# Patient Record
Sex: FEMALE | Race: WHITE
[De-identification: names, ages, dates, MRNs, and addresses within clinical notes are randomized per-mention and may not be internally consistent; named-entity substitution may affect disease eponyms.]

---

## 2017-09-21 ENCOUNTER — HOSPITAL ENCOUNTER (EMERGENCY)
Dept: HOSPITAL 54 - ER | Age: 57
Discharge: HOME | End: 2017-09-21
Payer: COMMERCIAL

## 2017-09-21 VITALS — SYSTOLIC BLOOD PRESSURE: 118 MMHG | DIASTOLIC BLOOD PRESSURE: 72 MMHG

## 2017-09-21 VITALS — HEIGHT: 69 IN | BODY MASS INDEX: 32.58 KG/M2 | WEIGHT: 220 LBS

## 2017-09-21 DIAGNOSIS — R18.8: ICD-10-CM

## 2017-09-21 DIAGNOSIS — N20.0: ICD-10-CM

## 2017-09-21 DIAGNOSIS — N83.201: Primary | ICD-10-CM

## 2017-09-21 DIAGNOSIS — K80.20: ICD-10-CM

## 2017-09-21 LAB
ALBUMIN SERPL BCP-MCNC: 3.1 G/DL (ref 3.4–5)
ALP SERPL-CCNC: 80 U/L (ref 46–116)
ALT SERPL W P-5'-P-CCNC: 16 U/L (ref 12–78)
AST SERPL W P-5'-P-CCNC: 33 U/L (ref 15–37)
BASOPHILS # BLD AUTO: 0.4 /CMM (ref 0–0.2)
BASOPHILS NFR BLD AUTO: 3 % (ref 0–2)
BILIRUB DIRECT SERPL-MCNC: 0.2 MG/DL (ref 0–0.2)
BILIRUB SERPL-MCNC: 1 MG/DL (ref 0.2–1)
BILIRUB UR QL STRIP: (no result)
BUN SERPL-MCNC: 15 MG/DL (ref 7–18)
CALCIUM SERPL-MCNC: 8.5 MG/DL (ref 8.5–10.1)
CHLORIDE SERPL-SCNC: 103 MMOL/L (ref 98–107)
CO2 SERPL-SCNC: 28 MMOL/L (ref 21–32)
CREAT SERPL-MCNC: 1.1 MG/DL (ref 0.6–1.3)
DEPRECATED SQUAMOUS URNS QL MICRO: (no result) /HPF
EOSINOPHIL # BLD AUTO: 0 /CMM (ref 0–0.7)
EOSINOPHIL NFR BLD AUTO: 0.3 % (ref 0–6)
GLUCOSE SERPL-MCNC: 112 MG/DL (ref 74–106)
HCT VFR BLD AUTO: 39 % (ref 33–45)
HGB BLD-MCNC: 13 G/DL (ref 11.5–14.8)
LYMPHOCYTES NFR BLD AUTO: 1.2 /CMM (ref 0.8–4.8)
LYMPHOCYTES NFR BLD AUTO: 8.8 % (ref 20–44)
LYMPHOCYTES NFR BLD MANUAL: 7 % (ref 16–48)
MCH RBC QN AUTO: 29 PG (ref 26–33)
MCHC RBC AUTO-ENTMCNC: 33 G/DL (ref 31–36)
MCV RBC AUTO: 86 FL (ref 82–100)
MONOCYTES NFR BLD AUTO: 0.9 /CMM (ref 0.1–1.3)
MONOCYTES NFR BLD AUTO: 6.6 % (ref 2–12)
NEUTROPHILS # BLD AUTO: 11.6 /CMM (ref 1.8–8.9)
NEUTROPHILS NFR BLD AUTO: 81.3 % (ref 43–81)
NEUTS SEG NFR BLD MANUAL: 93 % (ref 42–76)
PH UR STRIP: 5.5 [PH] (ref 5–8)
PLATELET # BLD AUTO: 236 /CMM (ref 150–450)
POTASSIUM SERPL-SCNC: 3.6 MMOL/L (ref 3.5–5.1)
PROT SERPL-MCNC: 7.5 G/DL (ref 6.4–8.2)
PROT UR QL STRIP: 30 MG/DL
RBC # BLD AUTO: 4.55 MIL/UL (ref 4–5.2)
RBC #/AREA URNS HPF: (no result) /HPF (ref 0–2)
RDW COEFFICIENT OF VARIATION: 11.9 (ref 11.5–15)
SODIUM SERPL-SCNC: 136 MMOL/L (ref 136–145)
UROBILINOGEN UR STRIP-MCNC: 0.2 EU/DL
WBC #/AREA URNS HPF: (no result) /HPF (ref 0–3)
WBC NRBC COR # BLD AUTO: 14.1 K/UL (ref 4.3–11)

## 2017-09-21 PROCEDURE — 96375 TX/PRO/DX INJ NEW DRUG ADDON: CPT

## 2017-09-21 PROCEDURE — 99285 EMERGENCY DEPT VISIT HI MDM: CPT

## 2017-09-21 PROCEDURE — 96361 HYDRATE IV INFUSION ADD-ON: CPT

## 2017-09-21 PROCEDURE — 81001 URINALYSIS AUTO W/SCOPE: CPT

## 2017-09-21 PROCEDURE — 36415 COLL VENOUS BLD VENIPUNCTURE: CPT

## 2017-09-21 PROCEDURE — 80048 BASIC METABOLIC PNL TOTAL CA: CPT

## 2017-09-21 PROCEDURE — 74176 CT ABD & PELVIS W/O CONTRAST: CPT

## 2017-09-21 PROCEDURE — Z7610: HCPCS

## 2017-09-21 PROCEDURE — A4606 OXYGEN PROBE USED W OXIMETER: HCPCS

## 2017-09-21 PROCEDURE — 76705 ECHO EXAM OF ABDOMEN: CPT

## 2017-09-21 PROCEDURE — 85025 COMPLETE CBC W/AUTO DIFF WBC: CPT

## 2017-09-21 PROCEDURE — 96374 THER/PROPH/DIAG INJ IV PUSH: CPT

## 2017-09-21 PROCEDURE — 76856 US EXAM PELVIC COMPLETE: CPT

## 2017-09-21 PROCEDURE — A6402 STERILE GAUZE <= 16 SQ IN: HCPCS

## 2017-09-21 PROCEDURE — 80076 HEPATIC FUNCTION PANEL: CPT

## 2017-09-21 NOTE — NUR
AAOX3, C/O RLQ ABD PAIN X 2 DAYS, INTERMITTENT, "SHARP", HURTS MORE USING 
ABDOMINAL MUSCLES. RESP IS EVEN AND UNLABORED WITH NAD NOTED. SKIN IS WARM AND 
DRY. AWAITING MD FOR EVAL.

## 2018-01-09 ENCOUNTER — HOSPITAL ENCOUNTER (EMERGENCY)
Dept: HOSPITAL 54 - ER | Age: 58
Discharge: HOME | End: 2018-01-09
Payer: COMMERCIAL

## 2018-01-09 VITALS — DIASTOLIC BLOOD PRESSURE: 65 MMHG | SYSTOLIC BLOOD PRESSURE: 111 MMHG

## 2018-01-09 VITALS — HEIGHT: 66 IN | WEIGHT: 215 LBS | BODY MASS INDEX: 34.55 KG/M2

## 2018-01-09 DIAGNOSIS — C56.9: ICD-10-CM

## 2018-01-09 DIAGNOSIS — J45.909: ICD-10-CM

## 2018-01-09 DIAGNOSIS — J11.1: Primary | ICD-10-CM

## 2018-01-09 LAB
ALBUMIN SERPL BCP-MCNC: 3.7 G/DL (ref 3.4–5)
ALP SERPL-CCNC: 82 U/L (ref 46–116)
ALT SERPL W P-5'-P-CCNC: 22 U/L (ref 12–78)
APTT PPP: 24 SEC (ref 23–34)
AST SERPL W P-5'-P-CCNC: 30 U/L (ref 15–37)
BASOPHILS # BLD AUTO: 0.1 /CMM (ref 0–0.2)
BASOPHILS NFR BLD AUTO: 2.4 % (ref 0–2)
BILIRUB DIRECT SERPL-MCNC: 0.1 MG/DL (ref 0–0.2)
BILIRUB SERPL-MCNC: 0.4 MG/DL (ref 0.2–1)
BILIRUB UR QL STRIP: (no result)
BUN SERPL-MCNC: 19 MG/DL (ref 7–18)
CALCIUM SERPL-MCNC: 9.3 MG/DL (ref 8.5–10.1)
CHLORIDE SERPL-SCNC: 97 MMOL/L (ref 98–107)
CO2 SERPL-SCNC: 28 MMOL/L (ref 21–32)
CREAT SERPL-MCNC: 1 MG/DL (ref 0.6–1.3)
EOSINOPHIL # BLD AUTO: 0 /CMM (ref 0–0.7)
EOSINOPHIL NFR BLD AUTO: 0.8 % (ref 0–6)
EOSINOPHIL NFR BLD MANUAL: 2 % (ref 0–4)
GLUCOSE SERPL-MCNC: 116 MG/DL (ref 74–106)
HCT VFR BLD AUTO: 34 % (ref 33–45)
HGB BLD-MCNC: 11.6 G/DL (ref 11.5–14.8)
INR PPP: 0.9 (ref 0.87–1.13)
KETONES UR STRIP-MCNC: 15 MG/DL
LIPASE SERPL-CCNC: 77 U/L (ref 73–393)
LYMPHOCYTES NFR BLD AUTO: 0.5 /CMM (ref 0.8–4.8)
LYMPHOCYTES NFR BLD AUTO: 17.8 % (ref 20–44)
LYMPHOCYTES NFR BLD MANUAL: 16 % (ref 16–48)
MCH RBC QN AUTO: 30 PG (ref 26–33)
MCHC RBC AUTO-ENTMCNC: 34 G/DL (ref 31–36)
MCV RBC AUTO: 86 FL (ref 82–100)
MONOCYTES NFR BLD AUTO: 0.1 /CMM (ref 0.1–1.3)
MONOCYTES NFR BLD AUTO: 2.7 % (ref 2–12)
MONOCYTES NFR BLD MANUAL: 6 % (ref 0–11)
NEUTROPHILS # BLD AUTO: 1.9 /CMM (ref 1.8–8.9)
NEUTROPHILS NFR BLD AUTO: 76.3 % (ref 43–81)
NEUTS BAND NFR BLD MANUAL: 10 % (ref 0–5)
NEUTS SEG NFR BLD MANUAL: 66 % (ref 42–76)
PH UR STRIP: 5.5 [PH] (ref 5–8)
PLATELET # BLD AUTO: 194 /CMM (ref 150–450)
POTASSIUM SERPL-SCNC: 4.6 MMOL/L (ref 3.5–5.1)
PROT SERPL-MCNC: 8.6 G/DL (ref 6.4–8.2)
PROT UR QL STRIP: 100 MG/DL
PROTHROMBIN TIME: 9.4 SECS (ref 9.5–12.7)
RBC # BLD AUTO: 3.91 MIL/UL (ref 4–5.2)
RBC #/AREA URNS HPF: (no result) /HPF (ref 0–2)
RDW COEFFICIENT OF VARIATION: 15.6 (ref 11.5–15)
SODIUM SERPL-SCNC: 131 MMOL/L (ref 136–145)
TROPONIN I SERPL-MCNC: < 0.017 NG/ML (ref 0–0.06)
UROBILINOGEN UR STRIP-MCNC: 0.2 EU/DL
WBC #/AREA URNS HPF: (no result) /HPF (ref 0–3)
WBC NRBC COR # BLD AUTO: 2.6 K/UL (ref 4.3–11)

## 2018-01-09 PROCEDURE — A4606 OXYGEN PROBE USED W OXIMETER: HCPCS

## 2018-01-09 PROCEDURE — Z7610: HCPCS

## 2018-01-09 NOTE — NUR
SENT BY PMD FOR EVAL D/T  FEVER TODAY, PT IS ON CHEMO (1/5/17) FOR OVARIAN 
CANCER. NAD NOTED, VSS, RESP EVEN AND UNLABORED, PT PUT ON HOSPITAL GOWN AND 
MONITOR. WAITING FOR MD [place].

## 2019-03-31 ENCOUNTER — HOSPITAL ENCOUNTER (EMERGENCY)
Dept: HOSPITAL 54 - ER | Age: 59
Discharge: HOME | End: 2019-03-31
Payer: COMMERCIAL

## 2019-03-31 VITALS — BODY MASS INDEX: 34.86 KG/M2 | WEIGHT: 230 LBS | HEIGHT: 68 IN

## 2019-03-31 VITALS — DIASTOLIC BLOOD PRESSURE: 62 MMHG | SYSTOLIC BLOOD PRESSURE: 128 MMHG

## 2019-03-31 DIAGNOSIS — Z85.43: ICD-10-CM

## 2019-03-31 DIAGNOSIS — J45.909: ICD-10-CM

## 2019-03-31 DIAGNOSIS — R07.89: Primary | ICD-10-CM

## 2019-03-31 LAB
ALBUMIN SERPL BCP-MCNC: 3.4 G/DL (ref 3.4–5)
ALP SERPL-CCNC: 111 U/L (ref 46–116)
ALT SERPL W P-5'-P-CCNC: 147 U/L (ref 12–78)
AST SERPL W P-5'-P-CCNC: 77 U/L (ref 15–37)
BASOPHILS # BLD AUTO: 0 /CMM (ref 0–0.2)
BASOPHILS NFR BLD AUTO: 0.2 % (ref 0–2)
BILIRUB DIRECT SERPL-MCNC: 0 MG/DL (ref 0–0.2)
BILIRUB SERPL-MCNC: 0.1 MG/DL (ref 0.2–1)
BUN SERPL-MCNC: 13 MG/DL (ref 7–18)
CALCIUM SERPL-MCNC: 8.8 MG/DL (ref 8.5–10.1)
CHLORIDE SERPL-SCNC: 105 MMOL/L (ref 98–107)
CO2 SERPL-SCNC: 27 MMOL/L (ref 21–32)
CREAT SERPL-MCNC: 1 MG/DL (ref 0.6–1.3)
D DIMER PPP FEU-MCNC: 0.98 MG/L(FEU (ref 0.17–0.5)
EOSINOPHIL NFR BLD AUTO: 2.7 % (ref 0–6)
EOSINOPHIL NFR BLD MANUAL: 1 % (ref 0–4)
GLUCOSE SERPL-MCNC: 103 MG/DL (ref 74–106)
HCT VFR BLD AUTO: 30 % (ref 33–45)
HGB BLD-MCNC: 10.2 G/DL (ref 11.5–14.8)
LYMPHOCYTES NFR BLD AUTO: 1.4 /CMM (ref 0.8–4.8)
LYMPHOCYTES NFR BLD AUTO: 62.8 % (ref 20–44)
LYMPHOCYTES NFR BLD MANUAL: 60 % (ref 16–48)
MCHC RBC AUTO-ENTMCNC: 34 G/DL (ref 31–36)
MCV RBC AUTO: 90 FL (ref 82–100)
MONOCYTES NFR BLD AUTO: 0.1 /CMM (ref 0.1–1.3)
MONOCYTES NFR BLD AUTO: 6.5 % (ref 2–12)
MONOCYTES NFR BLD MANUAL: 8 % (ref 0–11)
NEUTROPHILS # BLD AUTO: 0.6 /CMM (ref 1.8–8.9)
NEUTROPHILS NFR BLD AUTO: 27.8 % (ref 43–81)
NEUTS BAND NFR BLD MANUAL: 5 % (ref 0–5)
NEUTS SEG NFR BLD MANUAL: 26 % (ref 42–76)
NT-PROBNP SERPL-MCNC: 197 PG/ML (ref 0–125)
PLATELET # BLD AUTO: 32 /CMM (ref 150–450)
POTASSIUM SERPL-SCNC: 4.2 MMOL/L (ref 3.5–5.1)
PROT SERPL-MCNC: 7.8 G/DL (ref 6.4–8.2)
RBC # BLD AUTO: 3.33 MIL/UL (ref 4–5.2)
SODIUM SERPL-SCNC: 141 MMOL/L (ref 136–145)
WBC NRBC COR # BLD AUTO: 2.2 K/UL (ref 4.3–11)

## 2019-03-31 PROCEDURE — 71045 X-RAY EXAM CHEST 1 VIEW: CPT

## 2019-03-31 PROCEDURE — 85025 COMPLETE CBC W/AUTO DIFF WBC: CPT

## 2019-03-31 PROCEDURE — 85378 FIBRIN DEGRADE SEMIQUANT: CPT

## 2019-03-31 PROCEDURE — 85730 THROMBOPLASTIN TIME PARTIAL: CPT

## 2019-03-31 PROCEDURE — 36415 COLL VENOUS BLD VENIPUNCTURE: CPT

## 2019-03-31 PROCEDURE — 80076 HEPATIC FUNCTION PANEL: CPT

## 2019-03-31 PROCEDURE — 83880 ASSAY OF NATRIURETIC PEPTIDE: CPT

## 2019-03-31 PROCEDURE — 80048 BASIC METABOLIC PNL TOTAL CA: CPT

## 2019-03-31 PROCEDURE — 93005 ELECTROCARDIOGRAM TRACING: CPT

## 2019-03-31 PROCEDURE — 99284 EMERGENCY DEPT VISIT MOD MDM: CPT

## 2019-03-31 PROCEDURE — 84484 ASSAY OF TROPONIN QUANT: CPT

## 2019-03-31 PROCEDURE — 71275 CT ANGIOGRAPHY CHEST: CPT
